# Patient Record
Sex: MALE | Race: BLACK OR AFRICAN AMERICAN | HISPANIC OR LATINO | URBAN - METROPOLITAN AREA
[De-identification: names, ages, dates, MRNs, and addresses within clinical notes are randomized per-mention and may not be internally consistent; named-entity substitution may affect disease eponyms.]

---

## 2020-03-10 ENCOUNTER — EMERGENCY (EMERGENCY)
Facility: HOSPITAL | Age: 55
LOS: 1 days | Discharge: HOME | End: 2020-03-10
Attending: EMERGENCY MEDICINE | Admitting: EMERGENCY MEDICINE
Payer: MEDICAID

## 2020-03-10 VITALS
SYSTOLIC BLOOD PRESSURE: 143 MMHG | OXYGEN SATURATION: 98 % | RESPIRATION RATE: 18 BRPM | WEIGHT: 223.99 LBS | HEART RATE: 77 BPM | TEMPERATURE: 98 F | DIASTOLIC BLOOD PRESSURE: 84 MMHG | HEIGHT: 70 IN

## 2020-03-10 VITALS
SYSTOLIC BLOOD PRESSURE: 130 MMHG | TEMPERATURE: 98 F | DIASTOLIC BLOOD PRESSURE: 86 MMHG | RESPIRATION RATE: 18 BRPM | HEART RATE: 69 BPM | OXYGEN SATURATION: 97 %

## 2020-03-10 DIAGNOSIS — R26.2 DIFFICULTY IN WALKING, NOT ELSEWHERE CLASSIFIED: ICD-10-CM

## 2020-03-10 DIAGNOSIS — R42 DIZZINESS AND GIDDINESS: ICD-10-CM

## 2020-03-10 DIAGNOSIS — R11.2 NAUSEA WITH VOMITING, UNSPECIFIED: ICD-10-CM

## 2020-03-10 LAB
ALBUMIN SERPL ELPH-MCNC: 4 G/DL — SIGNIFICANT CHANGE UP (ref 3.3–5)
ALBUMIN SERPL ELPH-MCNC: 4.4 G/DL — SIGNIFICANT CHANGE UP (ref 3.3–5)
ALP SERPL-CCNC: 41 U/L — SIGNIFICANT CHANGE UP (ref 40–120)
ALP SERPL-CCNC: 42 U/L — SIGNIFICANT CHANGE UP (ref 40–120)
ALT FLD-CCNC: 70 U/L — HIGH (ref 10–45)
ALT FLD-CCNC: 76 U/L — HIGH (ref 10–45)
ANION GAP SERPL CALC-SCNC: 10 MMOL/L — SIGNIFICANT CHANGE UP (ref 5–17)
ANION GAP SERPL CALC-SCNC: 12 MMOL/L — SIGNIFICANT CHANGE UP (ref 5–17)
AST SERPL-CCNC: 25 U/L — SIGNIFICANT CHANGE UP (ref 10–40)
AST SERPL-CCNC: 29 U/L — SIGNIFICANT CHANGE UP (ref 10–40)
BASOPHILS # BLD AUTO: 0.02 K/UL — SIGNIFICANT CHANGE UP (ref 0–0.2)
BASOPHILS NFR BLD AUTO: 0.3 % — SIGNIFICANT CHANGE UP (ref 0–2)
BILIRUB SERPL-MCNC: 0.6 MG/DL — SIGNIFICANT CHANGE UP (ref 0.2–1.2)
BILIRUB SERPL-MCNC: 0.7 MG/DL — SIGNIFICANT CHANGE UP (ref 0.2–1.2)
BUN SERPL-MCNC: 8 MG/DL — SIGNIFICANT CHANGE UP (ref 7–23)
BUN SERPL-MCNC: 9 MG/DL — SIGNIFICANT CHANGE UP (ref 7–23)
CALCIUM SERPL-MCNC: 8.9 MG/DL — SIGNIFICANT CHANGE UP (ref 8.4–10.5)
CALCIUM SERPL-MCNC: 9.4 MG/DL — SIGNIFICANT CHANGE UP (ref 8.4–10.5)
CHLORIDE SERPL-SCNC: 100 MMOL/L — SIGNIFICANT CHANGE UP (ref 96–108)
CHLORIDE SERPL-SCNC: 96 MMOL/L — SIGNIFICANT CHANGE UP (ref 96–108)
CO2 SERPL-SCNC: 26 MMOL/L — SIGNIFICANT CHANGE UP (ref 22–31)
CO2 SERPL-SCNC: 26 MMOL/L — SIGNIFICANT CHANGE UP (ref 22–31)
CREAT SERPL-MCNC: 0.67 MG/DL — SIGNIFICANT CHANGE UP (ref 0.5–1.3)
CREAT SERPL-MCNC: 0.67 MG/DL — SIGNIFICANT CHANGE UP (ref 0.5–1.3)
EOSINOPHIL # BLD AUTO: 0.03 K/UL — SIGNIFICANT CHANGE UP (ref 0–0.5)
EOSINOPHIL NFR BLD AUTO: 0.4 % — SIGNIFICANT CHANGE UP (ref 0–6)
GLUCOSE SERPL-MCNC: 114 MG/DL — HIGH (ref 70–99)
GLUCOSE SERPL-MCNC: 148 MG/DL — HIGH (ref 70–99)
HCT VFR BLD CALC: 40.8 % — SIGNIFICANT CHANGE UP (ref 39–50)
HGB BLD-MCNC: 13.7 G/DL — SIGNIFICANT CHANGE UP (ref 13–17)
IMM GRANULOCYTES NFR BLD AUTO: 0.6 % — SIGNIFICANT CHANGE UP (ref 0–1.5)
LYMPHOCYTES # BLD AUTO: 1.82 K/UL — SIGNIFICANT CHANGE UP (ref 1–3.3)
LYMPHOCYTES # BLD AUTO: 25.6 % — SIGNIFICANT CHANGE UP (ref 13–44)
MCHC RBC-ENTMCNC: 31.1 PG — SIGNIFICANT CHANGE UP (ref 27–34)
MCHC RBC-ENTMCNC: 33.6 GM/DL — SIGNIFICANT CHANGE UP (ref 32–36)
MCV RBC AUTO: 92.5 FL — SIGNIFICANT CHANGE UP (ref 80–100)
MONOCYTES # BLD AUTO: 0.64 K/UL — SIGNIFICANT CHANGE UP (ref 0–0.9)
MONOCYTES NFR BLD AUTO: 9 % — SIGNIFICANT CHANGE UP (ref 2–14)
NEUTROPHILS # BLD AUTO: 4.55 K/UL — SIGNIFICANT CHANGE UP (ref 1.8–7.4)
NEUTROPHILS NFR BLD AUTO: 64.1 % — SIGNIFICANT CHANGE UP (ref 43–77)
NRBC # BLD: 0 /100 WBCS — SIGNIFICANT CHANGE UP (ref 0–0)
PLATELET # BLD AUTO: 256 K/UL — SIGNIFICANT CHANGE UP (ref 150–400)
POTASSIUM SERPL-MCNC: 4 MMOL/L — SIGNIFICANT CHANGE UP (ref 3.5–5.3)
POTASSIUM SERPL-MCNC: 4.2 MMOL/L — SIGNIFICANT CHANGE UP (ref 3.5–5.3)
POTASSIUM SERPL-SCNC: 4 MMOL/L — SIGNIFICANT CHANGE UP (ref 3.5–5.3)
POTASSIUM SERPL-SCNC: 4.2 MMOL/L — SIGNIFICANT CHANGE UP (ref 3.5–5.3)
PROT SERPL-MCNC: 6.8 G/DL — SIGNIFICANT CHANGE UP (ref 6–8.3)
PROT SERPL-MCNC: 7.4 G/DL — SIGNIFICANT CHANGE UP (ref 6–8.3)
RBC # BLD: 4.41 M/UL — SIGNIFICANT CHANGE UP (ref 4.2–5.8)
RBC # FLD: 12.2 % — SIGNIFICANT CHANGE UP (ref 10.3–14.5)
SODIUM SERPL-SCNC: 132 MMOL/L — LOW (ref 135–145)
SODIUM SERPL-SCNC: 138 MMOL/L — SIGNIFICANT CHANGE UP (ref 135–145)
WBC # BLD: 7.1 K/UL — SIGNIFICANT CHANGE UP (ref 3.8–10.5)
WBC # FLD AUTO: 7.1 K/UL — SIGNIFICANT CHANGE UP (ref 3.8–10.5)

## 2020-03-10 PROCEDURE — 93005 ELECTROCARDIOGRAM TRACING: CPT

## 2020-03-10 PROCEDURE — 70496 CT ANGIOGRAPHY HEAD: CPT | Mod: 26

## 2020-03-10 PROCEDURE — 93010 ELECTROCARDIOGRAM REPORT: CPT

## 2020-03-10 PROCEDURE — 0042T: CPT

## 2020-03-10 PROCEDURE — 99291 CRITICAL CARE FIRST HOUR: CPT | Mod: 25

## 2020-03-10 PROCEDURE — 99283 EMERGENCY DEPT VISIT LOW MDM: CPT

## 2020-03-10 PROCEDURE — 36415 COLL VENOUS BLD VENIPUNCTURE: CPT

## 2020-03-10 PROCEDURE — 70450 CT HEAD/BRAIN W/O DYE: CPT | Mod: 26

## 2020-03-10 PROCEDURE — 70498 CT ANGIOGRAPHY NECK: CPT | Mod: 26

## 2020-03-10 PROCEDURE — 71045 X-RAY EXAM CHEST 1 VIEW: CPT | Mod: 26

## 2020-03-10 PROCEDURE — 70496 CT ANGIOGRAPHY HEAD: CPT

## 2020-03-10 PROCEDURE — 70450 CT HEAD/BRAIN W/O DYE: CPT

## 2020-03-10 PROCEDURE — 96374 THER/PROPH/DIAG INJ IV PUSH: CPT | Mod: XU

## 2020-03-10 PROCEDURE — 71045 X-RAY EXAM CHEST 1 VIEW: CPT

## 2020-03-10 PROCEDURE — 85025 COMPLETE CBC W/AUTO DIFF WBC: CPT

## 2020-03-10 PROCEDURE — 80061 LIPID PANEL: CPT

## 2020-03-10 PROCEDURE — 85730 THROMBOPLASTIN TIME PARTIAL: CPT

## 2020-03-10 PROCEDURE — 80053 COMPREHEN METABOLIC PANEL: CPT

## 2020-03-10 PROCEDURE — 96375 TX/PRO/DX INJ NEW DRUG ADDON: CPT | Mod: XU

## 2020-03-10 PROCEDURE — 85610 PROTHROMBIN TIME: CPT

## 2020-03-10 PROCEDURE — 82962 GLUCOSE BLOOD TEST: CPT

## 2020-03-10 PROCEDURE — 99291 CRITICAL CARE FIRST HOUR: CPT

## 2020-03-10 PROCEDURE — 70498 CT ANGIOGRAPHY NECK: CPT

## 2020-03-10 RX ORDER — SODIUM CHLORIDE 9 MG/ML
1000 INJECTION INTRAMUSCULAR; INTRAVENOUS; SUBCUTANEOUS ONCE
Refills: 0 | Status: COMPLETED | OUTPATIENT
Start: 2020-03-10 | End: 2020-03-10

## 2020-03-10 RX ORDER — MECLIZINE HCL 12.5 MG
1 TABLET ORAL
Qty: 21 | Refills: 0
Start: 2020-03-10 | End: 2020-03-16

## 2020-03-10 RX ORDER — FAMOTIDINE 10 MG/ML
20 INJECTION INTRAVENOUS ONCE
Refills: 0 | Status: COMPLETED | OUTPATIENT
Start: 2020-03-10 | End: 2020-03-10

## 2020-03-10 RX ORDER — MECLIZINE HCL 12.5 MG
25 TABLET ORAL ONCE
Refills: 0 | Status: COMPLETED | OUTPATIENT
Start: 2020-03-10 | End: 2020-03-10

## 2020-03-10 RX ORDER — METOCLOPRAMIDE HCL 10 MG
10 TABLET ORAL ONCE
Refills: 0 | Status: COMPLETED | OUTPATIENT
Start: 2020-03-10 | End: 2020-03-10

## 2020-03-10 RX ADMIN — Medication 10 MILLIGRAM(S): at 16:05

## 2020-03-10 RX ADMIN — SODIUM CHLORIDE 1000 MILLILITER(S): 9 INJECTION INTRAMUSCULAR; INTRAVENOUS; SUBCUTANEOUS at 16:05

## 2020-03-10 RX ADMIN — Medication 25 MILLIGRAM(S): at 17:44

## 2020-03-10 RX ADMIN — FAMOTIDINE 20 MILLIGRAM(S): 10 INJECTION INTRAVENOUS at 16:05

## 2020-03-10 NOTE — ED PROVIDER NOTE - CARE PROVIDER_API CALL
Mar Mahmood)  Neurology; Vascular Neurology  [address], [address] [place] [address]  Phone: [phone]  Fax: [phone]  Follow Up Time: 1-3 Days

## 2020-03-10 NOTE — ED PROVIDER NOTE - CRANIAL NERVE AND PUPILLARY EXAM
corneal reflex intact/central vision intact/cranial nerves 2-12 intact/central and peripheral vision intact/extra-ocular movements intact/cough reflex intact/peripheral vision intact/gag reflex intact/tongue is midline

## 2020-03-10 NOTE — ED ADULT NURSE NOTE - CHPI ED NUR SYMPTOMS NEG
no vomiting/no weakness/no change in level of consciousness/no blurred vision/no fever/no numbness/no confusion/no loss of consciousness

## 2020-03-10 NOTE — ED ADULT TRIAGE NOTE - OTHER COMPLAINTS
Patient reports dizziness started 2-3 days ago and reports vomiting started last night. Ambulating with assistance of cane.

## 2020-03-10 NOTE — ED PROVIDER NOTE - OBJECTIVE STATEMENT
56 yo male with hx of lumbar HNPs, s/p epidural 1 week ago-, states he has been dizzy x 1 month - sometimes associated with positional changes-- no earlene headache or visual changes- no upper or lower ext weakness, no facial weakness or numbness - dizziness worse in the past 24 hrs  with N/V  no sob or cp no leg welling or calf tenderness -no incontinence of urine or stool  no DM or HTN  + FH of brain cancer-  no known hx of CVAs

## 2020-03-10 NOTE — ED ADULT NURSE NOTE - NSFALLRSKASSESSDT_ED_ALL_ED
Quality 317: Preventative Care And Screening: Screening For High Blood Pressure And Follow-Up Documented: Pre-hypertensive or hypertensive blood pressure reading documented, and the indicated follow-up is documented Quality 110: Preventive Care And Screening: Influenza Immunization: Influenza immunization was not ordered or administered, reason not given Additional Notes: blood pressure and weight outside of recommended parameters but acceptable. Quality 130: Documentation Of Current Medications In The Medical Record: Current Medications Documented Quality 128: Preventive Care And Screening: Body Mass Index (Bmi) Screening And Follow-Up Plan: BMI is documented above normal parameters and a follow-up plan is documented Detail Level: Detailed Quality 226: Preventive Care And Screening: Tobacco Use: Screening And Cessation Intervention: Patient screened for tobacco and never smoked Quality 431: Preventive Care And Screening: Unhealthy Alcohol Use - Screening: Patient screened for unhealthy alcohol use using a single question and scores less than 2 times per year 10-Mar-2020 15:06

## 2020-03-10 NOTE — ED PROVIDER NOTE - CLINICAL SUMMARY MEDICAL DECISION MAKING FREE TEXT BOX
vertigo--   CT/ CTA  neg -feeling better  dc on meclizine   nl gait on dc 54 yo male with  hx of herniated lumbar discs with 1 month hx of dizziness -unclear etiology- no obvious signs to suggest cerebellar CVA vertigo-- CT/ CTA  head and neck neg - CT perfusion neg --feeling better with reglan and meclizine ? BPPV  no evidence to suggest ACS --cleared by stroke team for any acute intervention--  dc on meclizine --  nl gait on dc--dizziness  improved

## 2020-03-10 NOTE — ED PROVIDER NOTE - PATIENT PORTAL LINK FT
You can access the FollowMyHealth Patient Portal offered by [facility] by registering at the following website: [email or web address]. By joining [facility]’s FollowMyHealth portal, you will also be able to view your health information using other applications (apps) compatible with our system.

## 2020-03-10 NOTE — ED ADULT NURSE NOTE - NSFALLRSKUNASSIST_ED_ALL_ED
Telephone Encounter by Den Dougherty MD at 06/05/17 10:03 AM     Author:  Den Dougherty MD Service:  (none) Author Type:  Physician     Filed:  06/05/17 10:03 AM Encounter Date:  6/5/2017 Status:  Signed     :  Den Dougherty MD (Physician)            yes, can use[JC1.1M] Hot moist compress[JC1.1T] four times a day.  cont. with drops, can use[JC1.1M] Artificial tears four times a day[JC1.1T] also.[JC1.1M]      Revision History        User Key Date/Time User Provider Type Action    > JC1.1 06/05/17 10:03 AM Den Dougherty MD Physician Sign    M - Manual, T - Template             no

## 2020-03-12 PROBLEM — Z00.00 ENCOUNTER FOR PREVENTIVE HEALTH EXAMINATION: Status: ACTIVE | Noted: 2020-03-12

## 2023-06-28 NOTE — ED PROVIDER NOTE - GENITOURINARY, MLM
Call primary doctor for follow up and review of labs.     General Instructions:  You are considered stable for discharge from the emergency department. Please carefully follow all the instructions you were given verbally as well as the written instructions given below. In general, immediately return to the emergency department if the symptoms you presented with today increase in severity, change in any way, and/or do not improve in what you consider an acceptable time frame.      Medication(s) Instructions (if applicable):  If you were given any medication(s) upon discharge, please strictly follow the directions as prescribed for taking the medication(s). Should you feel you develop any type of reaction to the medication(s), including, but not limited to, rash, swelling of the lips or face, or difficulty breathing, immediately discontinue the use of the medication(s) and seek prompt medical care. Please read the medication(s) insert provided by the pharmacy and follow all guidelines and recommendations. Please take all medications as prescribed for your symptoms or diagnoses.                Follow-Up Instructions:  If you were given instructions to follow-up with a health care provider upon discharge, please be sure to do so.  It is your responsibility to call and/or make an appointment with the health care providers listed on your discharge papers and/or your primary care provider in the appropriate time frame given.  Please take a copy of your discharge papers with you to your follow-up appointment(s). Please follow up with your primary care physician for your symptoms. Please follow up with any specialist provider in clinic that your were given instructions to see as an outpatient. Discuss any imaging/laboratory studies with your primary doctor for review of any additional findings that my require non-emergent follow up.    Special Information / Instructions:  Please read and follow all attached discharge  instructions.      Please call the Emergency Department/Urgent care at which you were seen with any questions or concerns:    [facility] Emergency Department at [phone]   [facility] Emergency Department at [phone]  [facility] Emergency Department at [phone]  [facility] [phone]   [facility] at [phone]    Please return to the Emergency Department if you have persistence or worsening of your symptoms.     No discharge, lesions.

## 2024-04-04 NOTE — ED ADULT NURSE REASSESSMENT NOTE - NS ED NURSE REASSESS COMMENT FT1
Md navarro at bedside. stroke code called. pt brought to CT scan. neuro at bedside. will cont to reasess.
none

## 2025-02-06 ENCOUNTER — EMERGENCY (EMERGENCY)
Facility: HOSPITAL | Age: 60
LOS: 1 days | Discharge: HOME | End: 2025-02-06
Attending: EMERGENCY MEDICINE | Admitting: EMERGENCY MEDICINE
Payer: MEDICAID

## 2025-02-06 VITALS
TEMPERATURE: 98 F | OXYGEN SATURATION: 98 % | HEART RATE: 80 BPM | DIASTOLIC BLOOD PRESSURE: 95 MMHG | SYSTOLIC BLOOD PRESSURE: 165 MMHG | RESPIRATION RATE: 18 BRPM

## 2025-02-06 VITALS
WEIGHT: 250 LBS | RESPIRATION RATE: 18 BRPM | SYSTOLIC BLOOD PRESSURE: 165 MMHG | OXYGEN SATURATION: 98 % | HEART RATE: 79 BPM | DIASTOLIC BLOOD PRESSURE: 98 MMHG | HEIGHT: 69 IN | TEMPERATURE: 98 F

## 2025-02-06 DIAGNOSIS — J11.1 INFLUENZA DUE TO UNIDENTIFIED INFLUENZA VIRUS WITH OTHER RESPIRATORY MANIFESTATIONS: ICD-10-CM

## 2025-02-06 DIAGNOSIS — R07.89 OTHER CHEST PAIN: ICD-10-CM

## 2025-02-06 DIAGNOSIS — Z91.040 LATEX ALLERGY STATUS: ICD-10-CM

## 2025-02-06 DIAGNOSIS — E66.9 OBESITY, UNSPECIFIED: ICD-10-CM

## 2025-02-06 DIAGNOSIS — R05.9 COUGH, UNSPECIFIED: ICD-10-CM

## 2025-02-06 LAB
ANION GAP SERPL CALC-SCNC: 13 MMOL/L — SIGNIFICANT CHANGE UP (ref 5–17)
BASOPHILS # BLD AUTO: 0.02 K/UL — SIGNIFICANT CHANGE UP (ref 0–0.2)
BASOPHILS NFR BLD AUTO: 0.4 % — SIGNIFICANT CHANGE UP (ref 0–2)
BUN SERPL-MCNC: 8 MG/DL — SIGNIFICANT CHANGE UP (ref 7–23)
CALCIUM SERPL-MCNC: 9.1 MG/DL — SIGNIFICANT CHANGE UP (ref 8.4–10.5)
CHLORIDE SERPL-SCNC: 105 MMOL/L — SIGNIFICANT CHANGE UP (ref 96–108)
CO2 SERPL-SCNC: 24 MMOL/L — SIGNIFICANT CHANGE UP (ref 22–31)
CREAT SERPL-MCNC: 0.7 MG/DL — SIGNIFICANT CHANGE UP (ref 0.5–1.3)
EGFR: 105 ML/MIN/1.73M2 — SIGNIFICANT CHANGE UP
EOSINOPHIL # BLD AUTO: 0 K/UL — SIGNIFICANT CHANGE UP (ref 0–0.5)
EOSINOPHIL NFR BLD AUTO: 0 % — SIGNIFICANT CHANGE UP (ref 0–6)
FLUAV AG NPH QL: DETECTED
FLUBV AG NPH QL: SIGNIFICANT CHANGE UP
GLUCOSE SERPL-MCNC: 131 MG/DL — HIGH (ref 70–99)
HCT VFR BLD CALC: 38.7 % — LOW (ref 39–50)
HGB BLD-MCNC: 12.8 G/DL — LOW (ref 13–17)
IMM GRANULOCYTES NFR BLD AUTO: 0.4 % — SIGNIFICANT CHANGE UP (ref 0–0.9)
LIDOCAIN IGE QN: 17 U/L — SIGNIFICANT CHANGE UP (ref 7–60)
LYMPHOCYTES # BLD AUTO: 0.74 K/UL — LOW (ref 1–3.3)
LYMPHOCYTES # BLD AUTO: 13.4 % — SIGNIFICANT CHANGE UP (ref 13–44)
MCHC RBC-ENTMCNC: 29.5 PG — SIGNIFICANT CHANGE UP (ref 27–34)
MCHC RBC-ENTMCNC: 33.1 G/DL — SIGNIFICANT CHANGE UP (ref 32–36)
MCV RBC AUTO: 89.2 FL — SIGNIFICANT CHANGE UP (ref 80–100)
MONOCYTES # BLD AUTO: 0.24 K/UL — SIGNIFICANT CHANGE UP (ref 0–0.9)
MONOCYTES NFR BLD AUTO: 4.3 % — SIGNIFICANT CHANGE UP (ref 2–14)
NEUTROPHILS # BLD AUTO: 4.5 K/UL — SIGNIFICANT CHANGE UP (ref 1.8–7.4)
NEUTROPHILS NFR BLD AUTO: 81.5 % — HIGH (ref 43–77)
NRBC # BLD: 0 /100 WBCS — SIGNIFICANT CHANGE UP (ref 0–0)
NRBC BLD-RTO: 0 /100 WBCS — SIGNIFICANT CHANGE UP (ref 0–0)
PLATELET # BLD AUTO: 189 K/UL — SIGNIFICANT CHANGE UP (ref 150–400)
POTASSIUM SERPL-MCNC: 4 MMOL/L — SIGNIFICANT CHANGE UP (ref 3.5–5.3)
POTASSIUM SERPL-SCNC: 4 MMOL/L — SIGNIFICANT CHANGE UP (ref 3.5–5.3)
RBC # BLD: 4.34 M/UL — SIGNIFICANT CHANGE UP (ref 4.2–5.8)
RBC # FLD: 11.7 % — SIGNIFICANT CHANGE UP (ref 10.3–14.5)
RSV RNA NPH QL NAA+NON-PROBE: SIGNIFICANT CHANGE UP
SARS-COV-2 RNA SPEC QL NAA+PROBE: SIGNIFICANT CHANGE UP
SODIUM SERPL-SCNC: 142 MMOL/L — SIGNIFICANT CHANGE UP (ref 135–145)
TROPONIN T, HIGH SENSITIVITY RESULT: <6 NG/L — SIGNIFICANT CHANGE UP (ref 0–51)
WBC # BLD: 5.52 K/UL — SIGNIFICANT CHANGE UP (ref 3.8–10.5)
WBC # FLD AUTO: 5.52 K/UL — SIGNIFICANT CHANGE UP (ref 3.8–10.5)

## 2025-02-06 PROCEDURE — 85025 COMPLETE CBC W/AUTO DIFF WBC: CPT

## 2025-02-06 PROCEDURE — 99284 EMERGENCY DEPT VISIT MOD MDM: CPT | Mod: 25

## 2025-02-06 PROCEDURE — 84484 ASSAY OF TROPONIN QUANT: CPT

## 2025-02-06 PROCEDURE — 99285 EMERGENCY DEPT VISIT HI MDM: CPT

## 2025-02-06 PROCEDURE — 96374 THER/PROPH/DIAG INJ IV PUSH: CPT

## 2025-02-06 PROCEDURE — 71045 X-RAY EXAM CHEST 1 VIEW: CPT

## 2025-02-06 PROCEDURE — 0241U: CPT

## 2025-02-06 PROCEDURE — 36415 COLL VENOUS BLD VENIPUNCTURE: CPT

## 2025-02-06 PROCEDURE — 83690 ASSAY OF LIPASE: CPT

## 2025-02-06 PROCEDURE — 71045 X-RAY EXAM CHEST 1 VIEW: CPT | Mod: 26

## 2025-02-06 PROCEDURE — 80048 BASIC METABOLIC PNL TOTAL CA: CPT

## 2025-02-06 PROCEDURE — 87637 SARSCOV2&INF A&B&RSV AMP PRB: CPT

## 2025-02-06 RX ORDER — KETOROLAC TROMETHAMINE 10 MG
15 TABLET ORAL ONCE
Refills: 0 | Status: DISCONTINUED | OUTPATIENT
Start: 2025-02-06 | End: 2025-02-06

## 2025-02-06 RX ORDER — IBUPROFEN 600 MG/1
1 TABLET, FILM COATED ORAL
Qty: 15 | Refills: 0
Start: 2025-02-06 | End: 2025-02-10

## 2025-02-06 RX ORDER — OSELTAMIVIR PHOSPHATE 75 MG/1
1 CAPSULE ORAL
Qty: 10 | Refills: 0
Start: 2025-02-06 | End: 2025-02-10

## 2025-02-06 RX ADMIN — Medication 15 MILLIGRAM(S): at 17:33

## 2025-02-06 NOTE — ED PROVIDER NOTE - ATTENDING APP SHARED VISIT CONTRIBUTION OF CARE
I discussed the plan of care of the patient directly with the PA while the patient was in the Emergency Department. I have reviewed the ACP note and agree with the history, exam and plan of care. I performed a substantive portion of the MDM. CXR clear but +flu A on swab, likely causing his symptoms. DC home in NAD with strict return precautions given.

## 2025-02-06 NOTE — ED ADULT NURSE NOTE - OBJECTIVE STATEMENT
Pt c/o "the areas in between my ribs hurt", endorsed feeling sick over the past week. w cough, congestion and N/V.  Pt endorsed today episode of vomiting with blood tinged mucus, cp, headache.

## 2025-02-06 NOTE — ED PROVIDER NOTE - RESPIRATORY, MLM
Breath sounds clear and equal bilaterally. no rales, no rhonchi, no wheezes b/l, no accessory muscle use, + diffuse anterior chest wall tend to palp, no direct rib tend b/l

## 2025-02-06 NOTE — ED PROVIDER NOTE - WHICH SHOWED
CXR read: The heart appears to be within normal limits in transverse diameter.  No acute infiltrates, effusions or evidence of pulmonary vascular congestion.  The chest wall and surrounding bony structures appear normal.  ED Physician: Dr. Merry Mathews

## 2025-02-06 NOTE — ED ADULT TRIAGE NOTE - CHIEF COMPLAINT QUOTE
Pt c/o "the areas in between my ribs hurt", endorsed feeling sick over the past week. Pt endorsed today episode of vomiting with blood tinged mucus, cp, headache.

## 2025-02-06 NOTE — ED PROVIDER NOTE - OBJECTIVE STATEMENT
The pt is a 58 y/o M, who presents to c/o uri symptoms x few d, now having cp when coughing. Cough mostly dry, occasionally productive w/sputum, today saw few specs of blood in sputum, pain to entire chest wall - worse w/moving and coughing, has not taken any pain meds. Denies sob, palpitations, n/v/d, abd pain, dizziness, syncope.

## 2025-02-06 NOTE — ED PROVIDER NOTE - PATIENT PORTAL LINK FT
You can access the FollowMyHealth Patient Portal offered by [facility] by registering at the following website: [email or web address]. By joining [facility]’s FollowMyHealth portal, you will also be able to view your health information using other applications (apps) compatible with our system.

## 2025-02-06 NOTE — ED PROVIDER NOTE - NSFOLLOWUPINSTRUCTIONS_ED_ALL_ED_FT
Chest Wall Pain  Chest wall pain is pain in or around the bones and muscles of your chest. Chest wall pain may be caused by:  An injury.  Coughing a lot.  Using your chest and arm muscles too much.  Sometimes, the cause may not be known. This pain may take a few weeks or longer to get better.  Follow these instructions at home:  Managing pain, stiffness, and swelling  A bag of ice on a towel on the skin  If told, put ice on the painful area:  Put ice in a plastic bag.  Place a towel between your skin and the bag.  Leave the ice on for 20 minutes, 2–3 times a day.  Activity  Rest as told by your doctor.  Avoid doing things that cause pain. This includes lifting heavy items.  Ask your doctor what activities are safe for you.  General instructions  A do not smoke cigarettes sign.  Take over-the-counter and prescription medicines only as told by your doctor.  Do not use any products that contain nicotine or tobacco, such as cigarettes, e-cigarettes, and chewing tobacco. If you need help quitting, ask your doctor.  Keep all follow-up visits as told by your doctor. This is important.  Contact a doctor if:  You have a fever.  Your chest pain gets worse.  You have new symptoms.  Get help right away if:  You feel sick to your stomach (nauseous) or you throw up (vomit).  You feel sweaty or light-headed.  You have a cough with mucus from your lungs (sputum) or you cough up blood.  You are short of breath.  These symptoms may be an emergency. Do not wait to see if the symptoms will go away. Get medical help right away. Call your local emergency services (911 in the [place] Do not drive yourself to the hospital.  Summary  Chest wall pain is pain in or around the bones and muscles of your chest.  It may be treated with ice, rest, and medicines. Your condition may also get better if you avoid doing things that cause pain.  Contact a doctor if you have a fever, chest pain that gets worse, or new symptoms.  Get help right away if you feel light-headed or you get short of breath. These symptoms may be an emergency  Viral Respiratory Infection  A viral respiratory infection is an illness that affects parts of the body used for breathing, like the lungs, nose, and throat. It is caused by a germ called a virus. Symptoms can include runny nose, coughing, sneezing, fatigue, body aches, sore throat, fever, or headache. Over the counter medicine can be used to manage the symptoms but the infection typically goes away on its own in 5 to 10 days.   SEEK IMMEDIATE MEDICAL CARE IF YOU HAVE ANY OF THE FOLLOWING SYMPTOMS: shortness of breath, chest pain, fever over 10 days, or lightheadedness/dizziness. Chest Wall Pain  Chest wall pain is pain in or around the bones and muscles of your chest. Chest wall pain may be caused by:  An injury.  Coughing a lot.  Using your chest and arm muscles too much.  Sometimes, the cause may not be known. This pain may take a few weeks or longer to get better.  Follow these instructions at home:  Managing pain, stiffness, and swelling  A bag of ice on a towel on the skin  If told, put ice on the painful area:  Put ice in a plastic bag.  Place a towel between your skin and the bag.  Leave the ice on for 20 minutes, 2–3 times a day.  Activity  Rest as told by your doctor.  Avoid doing things that cause pain. This includes lifting heavy items.  Ask your doctor what activities are safe for you.  General instructions  A do not smoke cigarettes sign.  Take over-the-counter and prescription medicines only as told by your doctor.  Do not use any products that contain nicotine or tobacco, such as cigarettes, e-cigarettes, and chewing tobacco. If you need help quitting, ask your doctor.  Keep all follow-up visits as told by your doctor. This is important.  Contact a doctor if:  You have a fever.  Your chest pain gets worse.  You have new symptoms.  Get help right away if:  You feel sick to your stomach (nauseous) or you throw up (vomit).  You feel sweaty or light-headed.  You have a cough with mucus from your lungs (sputum) or you cough up blood.  You are short of breath.  These symptoms may be an emergency. Do not wait to see if the symptoms will go away. Get medical help right away. Call your local emergency services (911 in the [place] Do not drive yourself to the hospital.  Summary  Chest wall pain is pain in or around the bones and muscles of your chest.  It may be treated with ice, rest, and medicines. Your condition may also get better if you avoid doing things that cause pain.  Contact a doctor if you have a fever, chest pain that gets worse, or new symptoms.  Get help right away if you feel light-headed or you get short of breath. These symptoms may be an emergency  Viral Respiratory Infection  A viral respiratory infection is an illness that affects parts of the body used for breathing, like the lungs, nose, and throat. It is caused by a germ called a virus. Symptoms can include runny nose, coughing, sneezing, fatigue, body aches, sore throat, fever, or headache. Over the counter medicine can be used to manage the symptoms but the infection typically goes away on its own in 5 to 10 days.   SEEK IMMEDIATE MEDICAL CARE IF YOU HAVE ANY OF THE FOLLOWING SYMPTOMS: shortness of breath, chest pain, fever over 10 days, or lightheadedness/dizziness.  Influenza, Adult  Influenza is also called the flu. It's an infection that affects your respiratory tract. This includes your nose, throat, windpipe, and lungs.    The flu is contagious. This means it spreads easily from person to person. It causes symptoms that are like a cold. It can also cause a high fever and body aches.    What are the causes?  The flu is caused by the influenza virus. You can get it by:  Breathing in droplets that are in the air after an infected person coughs or sneezes.  Touching something that has the virus on it and then touching your mouth, nose, or eyes.  What increases the risk?  You may be more likely to get the flu if:  You don't wash your hands often.  You're near a lot of people during cold and flu season.  You touch your mouth, eyes, or nose without washing your hands first.  You don't get a flu shot each year.  You may also be more at risk for the flu and serious problems, such as a lung infection called pneumonia, if:  You're older than 65.  You're pregnant.  Your immune system is weak. Your immune system is your body's defense system.  You have a long-term, or chronic, condition, such as:  Heart, kidney, or lung disease.  Diabetes.  A liver disorder.  Asthma.  You're very overweight.  You have anemia. This is when you don't have enough red blood cells in your body.  What are the signs or symptoms?  Flu symptoms often start all of a sudden. They may last 4–14 days and include:  Fever and chills.  Headaches, body aches, or muscle aches.  Sore throat.  Cough.  Runny or stuffy nose.  Discomfort in your chest.  Not wanting to eat as much as normal.  Feeling weak or tired.  Feeling dizzy.  Nausea or vomiting.  How is this diagnosed?  The flu may be diagnosed based on your symptoms and medical history. You may also have a physical exam. A swab may be taken from your nose or throat and tested for the virus.  How is this treated?  If the flu is found early, you can be treated with antiviral medicine. This may be given to you by mouth or through an IV. It can help you feel less sick and get better faster.  Taking care of yourself at home can also help your symptoms get better. Your health care provider may tell you to:  Take over-the-counter medicines.  Drink lots of fluids.  The flu often goes away on its own. If you have very bad symptoms or problems caused by the flu, you may need to be treated in a hospital.  Follow these instructions at home:  Activity  Rest as needed. Get lots of sleep.  Stay home from work or school as told by your provider.  Leave home only to go see your provider.  Do not leave home for other reasons until you don't have a fever for 24 hours without taking medicine.  Eating and drinking  Take an oral rehydration solution (ORS). This is a drink that is sold at pharmacies and stores.  Drink enough fluid to keep your pee pale yellow.  Try to drink small amounts of clear fluids. These include water, ice chips, fruit juice mixed with water, and low-calorie sports drinks.  Try to eat bland foods that are easy to digest. These include bananas, applesauce, rice, lean meats, toast, and crackers.  Avoid drinks that have a lot of sugar or caffeine in them. These include energy drinks, regular sports drinks, and soda.  Do not drink alcohol.  Do not eat spicy or fatty foods.  General instructions  A person covering their mouth and nose with a cloth while sneezing or coughing.  Washing hands with soap and water.  Take your medicines only as told by your provider.  Use a cool mist humidifier to add moisture to the air in your home. This can make it easier for you to breathe. You should also clean the humidifier every day. To do so:  Empty the water.  Pour clean water in.  Cover your mouth and nose when you cough or sneeze.  Wash your hands with soap and water often and for at least 20 seconds. It's extra important to do so after you cough or sneeze. If you can't use soap and water, use hand .  How is this prevented?  A person receiving an injection in the upper arm.  Get a flu shot every year. Ask your provider when you should get your flu shot.  Stay away from people who are sick during fall and winter. Fall and winter are cold and flu season.  Contact a health care provider if:  You get new symptoms.  You have chest pain.  You have watery poop, also called diarrhea.  You have a fever.  Your cough gets worse.  You start to have more mucus.  You feel like you may vomit, or you vomit.  Get help right away if:  You become short of breath or have trouble breathing.  Your skin or nails turn blue.  You have very bad pain or stiffness in your neck.  You get a sudden headache or pain in your face or ear.  You vomit each time you eat or drink.  These symptoms may be an emergency. Call 911 right away.  Do not wait to see if the symptoms will go away.  Do not drive yourself to the hospital.

## 2025-02-06 NOTE — ED PROVIDER NOTE - SKIN, MLM
For Your Information   · If you are in need of a medication refill please use one of the following options:  1. Call your pharmacy for all medication refills and renewals.   2. [facility]- [email or web address]  3. Call your providers office    · If your provider ordered any imaging for you today. Our pre-scheduling services will be reaching out to you within 2 business days to schedule this. [employer] can be reached by calling [phone]    · If your provider ordered testing today, you will be notified of your test results within 3-5 business days unless specified otherwise. If you have not received your results within 5 business days please call your provider's office.    · You may be receiving a survey.  Please take the time to complete this, as your feedback is very important to us!  We strive to make your experience exceptional and your comments help us with that goal.  We look forward to hearing from you!    · For all future appointments please arrive 15 minutes prior to your scheduled visit.     ·          
Skin normal color for race, warm, dry and intact.
Additional Progress Note...

## 2025-02-06 NOTE — ED PROVIDER NOTE - CLINICAL SUMMARY MEDICAL DECISION MAKING FREE TEXT BOX
pt w/uri symptoms x few d, today having generalized cp w/coughing and touching, saw some blood tinged specs in sputum, no pe or tb risk factors, pain reproducible on exam - suspect chest wall pain/msk, ekg non ischemic, labs wnl, cxr clear, pain resolved w/toradol, will dc w/ibuprofen and tessalon prn, to rest and hydrate, f/u w/pmd, pt understands and agrees w/plan, strict return precautions given pt w/uri symptoms x few d, today having generalized cp w/coughing and touching, saw some blood tinged specs in sputum, no pe or tb risk factors, pain reproducible on exam - suspect chest wall pain/msk, ekg non ischemic, labs wnl, cxr clear, pain resolved w/toradol, + flu - in window for tamiflu hence will rx, will dc w/ibuprofen and tessalon prn, to rest and hydrate, f/u w/pmd, pt understands and agrees w/plan, strict return precautions given

## 2025-02-06 NOTE — ED PROVIDER NOTE - CARE PLAN
Principal Discharge DX:	Chest wall pain  Secondary Diagnosis:	Upper respiratory infection   1 Principal Discharge DX:	Chest wall pain  Secondary Diagnosis:	Upper respiratory infection  Secondary Diagnosis:	Flu